# Patient Record
(demographics unavailable — no encounter records)

---

## 2024-11-25 NOTE — DISCUSSION/SUMMARY
[EKG obtained to assist in diagnosis and management of assessed problem(s)] : EKG obtained to assist in diagnosis and management of assessed problem(s) [FreeTextEntry1] : Abnormal ECG: The impression is bundle branch block. Currently, this condition is deteriorating. The diagnostic plan includes an echocardiogram. There are no changes in medication management. The plan was discussed with the patient.  Copd stable on exam  bloods pending  LBB on ekg  Echo Lvef 55 MAC MOD MR ANT JET  Carotid smooth plaque

## 2024-11-25 NOTE — HISTORY OF PRESENT ILLNESS
[FreeTextEntry1] : 11/16/22 feels well no sob or wheeze  wgt stable nl BM  nl sleep pattern Has RLE pain with walking  longstanding spinal stenosis  remains on inhalers  Had covid infection in 8/22 rx w paxlovid  resp statius stable no wheeze  03/08/23 Denies Chest Pain, SOB, Dizziness, Leg edema, Orthopnea, PND, Palpitations, Syncope, FLOWER, Diaphoresis. no new symptoms of dyspnea chest pain or syncope/ near syncope  mild leg ulcer left ankle trauma  07/05/23 PRIOR ekg lbbb NOW RESOLVED  NO SYNCOPE OR NEAR SYNCOPE  COPD AT BASELINE    10/18/23 copd at baseline seen by PULM on inhaler got Flu shot as well as COVID booster  mod back pain at times 10/10 Had relief w tramadol  2/21/24 seen for paroxysmal LBBB  copd and LS spine pain  dyspnea stable no chenge   no syncope or near syncope   5/22/2024: Overall doing well  States that her back has been bothering her  No dizziness or lightheadedness Notes that she sometimes has trouble getting a breath out, but breathing is at baseline  08/21/24 copd at baseline no sscp or HA   11/25/24 s/p mechanical fall at home NO loc had back injury seen in ER no Fx noted  was noted to HAVE PNA and rx for 4 days  alos rx for uti was d/c OCT 31 2024

## 2024-11-25 NOTE — PHYSICAL EXAM
[Well Developed] : well developed [Well Nourished] : well nourished [No Acute Distress] : no acute distress [Normal Conjunctiva] : normal conjunctiva [Normal Venous Pressure] : normal venous pressure [No Carotid Bruit] : no carotid bruit [No Rub] : no rub [No Gallop] : no gallop [Normal] : normal [Normal S1] : normal S1 [Normal S2] : normal S2 [II] : a grade 2 [No Pitting Edema] : no pitting edema present [Right Carotid Bruit] : right carotid bruit heard [Left Carotid Bruit] : left carotid bruit heard [Clear Lung Fields] : clear lung fields [Good Air Entry] : good air entry [No Respiratory Distress] : no respiratory distress  [Soft] : abdomen soft [Non Tender] : non-tender [No Masses/organomegaly] : no masses/organomegaly [Normal Bowel Sounds] : normal bowel sounds [Normal Gait] : normal gait [No Edema] : no edema [No Cyanosis] : no cyanosis [No Clubbing] : no clubbing [No Varicosities] : no varicosities [No Rash] : no rash [No Skin Lesions] : no skin lesions [Moves all extremities] : moves all extremities [No Focal Deficits] : no focal deficits [Normal Speech] : normal speech [Alert and Oriented] : alert and oriented [Normal memory] : normal memory

## 2024-11-25 NOTE — END OF VISIT
[Time Spent: ___ minutes] : I have spent [unfilled] minutes of time on the encounter which excludes teaching and separately reported services. [FreeTextEntry3] : Documented by Gabriela Gudino acting as a scribe for Dr. Michael Mcnamara. 05/22/2024   All medical record entries made by Gabriela Gudino (Scribe) were at my, Dr. Michael Mcnamara, direction and personally dictated by me on 05/22/2024. I have reviewed the chart and agree that the record accurately reflects my personal performance of the history, physical exam, assessment and plan. I have also personally directed, reviewed, and agreed with the chart.

## 2024-11-25 NOTE — ADDENDUM
[FreeTextEntry1] : Eddi Vee assisted in documentation on 11/25/24 acting as a scribe for Dr. Michael Mcnamara.

## 2025-03-03 NOTE — DISCUSSION/SUMMARY
[FreeTextEntry1] : Abnormal ECG: The impression is bundle branch block. Currently, this condition is deteriorating. The diagnostic plan includes an echocardiogram. There are no changes in medication management. The plan was discussed with the patient.  Copd stable on exam  bloods pending  LBB on ekg

## 2025-07-02 NOTE — HISTORY OF PRESENT ILLNESS
[FreeTextEntry1] : 11/16/22 feels well no sob or wheeze  wgt stable nl BM  nl sleep pattern Has RLE pain with walking  longstanding spinal stenosis  remains on inhalers  Had covid infection in 8/22 rx w paxlovid  resp statius stable no wheeze  03/08/23 Denies Chest Pain, SOB, Dizziness, Leg edema, Orthopnea, PND, Palpitations, Syncope, FLOWER, Diaphoresis. no new symptoms of dyspnea chest pain or syncope/ near syncope  mild leg ulcer left ankle trauma  07/05/23 PRIOR ekg lbbb NOW RESOLVED  NO SYNCOPE OR NEAR SYNCOPE  COPD AT BASELINE    10/18/23 copd at baseline seen by PULM on inhaler got Flu shot as well as COVID booster  mod back pain at times 10/10 Had relief w tramadol  2/21/24 seen for paroxysmal LBBB  copd and LS spine pain  dyspnea stable no chenge   no syncope or near syncope   5/22/2024: Overall doing well  States that her back has been bothering her  No dizziness or lightheadedness Notes that she sometimes has trouble getting a breath out, but breathing is at baseline  08/21/24 copd at baseline no sscp or HA   11/25/24 s/p mechanical fall at home NO loc had back injury seen in ER no Fx noted  was noted to HAVE PNA and rx for 4 days  [facility]os rx for uti was d/c OCT 31 2024  3/3/25 balance is a problem getting PT has no walking aid  dyspnea at baseline uses inhalers no cough   7/2/25 s/p fall non syncopal had right elbow fx  no LOC

## 2025-07-02 NOTE — DISCUSSION/SUMMARY
[EKG obtained to assist in diagnosis and management of assessed problem(s)] : EKG obtained to assist in diagnosis and management of assessed problem(s) [FreeTextEntry1] : Abnormal ECG: The impression is bundle branch block. Currently, this condition is deteriorating. The diagnostic plan includes an echocardiogram. There are no changes in medication management. The plan was discussed with the patient.  Copd stable on exam  bloods pending  LBB on ekg

## 2025-07-02 NOTE — ADDENDUM
[FreeTextEntry1] : Eddi Vee assisted in documentation on 7/2/25 acting as a scribe for Dr. Michael Mcnamara.